# Patient Record
Sex: MALE | Race: WHITE
[De-identification: names, ages, dates, MRNs, and addresses within clinical notes are randomized per-mention and may not be internally consistent; named-entity substitution may affect disease eponyms.]

---

## 2018-03-13 ENCOUNTER — HOSPITAL ENCOUNTER (EMERGENCY)
Dept: HOSPITAL 7 - FB.ED | Age: 33
Discharge: HOME | End: 2018-03-13
Payer: SELF-PAY

## 2018-03-13 DIAGNOSIS — Z77.22: ICD-10-CM

## 2018-03-13 DIAGNOSIS — R10.9: ICD-10-CM

## 2018-03-13 DIAGNOSIS — F17.210: ICD-10-CM

## 2018-03-13 DIAGNOSIS — M79.661: Primary | ICD-10-CM

## 2018-03-13 PROCEDURE — 96372 THER/PROPH/DIAG INJ SC/IM: CPT

## 2018-03-13 PROCEDURE — 99283 EMERGENCY DEPT VISIT LOW MDM: CPT

## 2018-03-13 PROCEDURE — 80305 DRUG TEST PRSMV DIR OPT OBS: CPT

## 2018-03-13 PROCEDURE — 84550 ASSAY OF BLOOD/URIC ACID: CPT

## 2018-03-13 PROCEDURE — 85025 COMPLETE CBC W/AUTO DIFF WBC: CPT

## 2018-03-13 PROCEDURE — 80048 BASIC METABOLIC PNL TOTAL CA: CPT

## 2018-03-13 PROCEDURE — 81001 URINALYSIS AUTO W/SCOPE: CPT

## 2018-03-13 PROCEDURE — 36415 COLL VENOUS BLD VENIPUNCTURE: CPT

## 2018-03-13 NOTE — EDM.PDOC
ED HPI GENERAL MEDICAL PROBLEM





- General


Chief Complaint: Lower Extremity Injury/Pain


Stated Complaint: RT ANKLE PAIN


Time Seen by Provider: 03/13/18 19:40


Source of Information: Reports: Patient


History Limitations: Reports: No Limitations





- History of Present Illness


INITIAL COMMENTS - FREE TEXT/NARRATIVE: 





Ronnie comes into [facility] ED with a 2 day hx of R lower leg pain and discoloration 

of unknown significance. There was reported redness and some swelling of the R 

lower leg that was painful, without specific joint swelling. The pain now seems 

to have migrated up the lateral aspect of the RLE to involve the R flank area. 

Pain is aggravated with movement of the R side, such as raising his RUE 

overhead. There is no jerome chest pain or SOB. There are no GI sxs of fullness, 

nausea, loss of appetite or change in bowel habit. There is no injury hx. He 

has tried Ibuprofen for pain relief.  He has been helping a friend move some 

furniture recently. He is unemployed. 





- Related Data


 Allergies











Allergy/AdvReac Type Severity Reaction Status Date / Time


 


No Known Allergies Allergy   Verified 10/06/16 17:12











Home Meds: 


 Home Meds





Amoxicillin [Take Home: Amoxicillin 500 MG, 2 Cap Pack] 500 mg PO TID 10/06/16 [

History]











Past Medical History





- Past Health History


Medical/Surgical History: Denies Medical/Surgical History


Respiratory History: Reports: Asthma





- Past Surgical History


Respiratory Surgical History: Reports: None





Social & Family History





- Family History


Family Medical History: Noncontributory





- Tobacco Use


Smoking Status *Q: Current Every Day Smoker


Years of Tobacco use: 12


Packs/Tins Daily: 0.5


Used Tobacco, but Quit: No


Second Hand Smoke Exposure: Yes





- Caffeine Use


Caffeine Use: Reports: None





- Alcohol Use


Days Per Week of Alcohol Use: 0


Number of Drinks Per Day: 2


Total Drinks Per Week: 0





- Recreational Drug Use


Recreational Drug Use: No


Drug Use in Last 12 Months: No


Recreational Drug Type: Reports: Marijuana/Hashish





- Living Situation & Occupation


Living situation: Reports: Other


Occupation: Employed





ED ROS GENERAL





- Review of Systems


Review Of Systems: See Below


Constitutional: Reports: No Symptoms


HEENT: Reports: No Symptoms


Respiratory: Reports: No Symptoms


Cardiovascular: Reports: No Symptoms


Endocrine: Reports: No Symptoms


GI/Abdominal: Reports: Abdominal Pain (R flank)


: Reports: Flank Pain (Right)


Musculoskeletal: Reports: Leg Pain (R leg)


Skin: Reports: Erythema (R leg)


Neurological: Reports: No Symptoms


Psychiatric: Reports: No Symptoms


Hematologic/Lymphatic: Reports: No Symptoms


Immunologic: Reports: No Symptoms





ED EXAM, GENERAL





- Physical Exam


Exam: See Below


Exam Limited By: No Limitations


General Appearance: Alert, WD/WN, Anxious, Mild Distress


Head: Normocephalic


Neck: Normal Inspection, Supple, Non-Tender, Full Range of Motion


Respiratory/Chest: Lungs Clear, Normal Breath Sounds, Chest Non-Tender


Cardiovascular: Normal Peripheral Pulses, Regular Rate, Rhythm, No Murmur


GI/Abdominal: Normal Bowel Sounds, Soft, No Organomegaly, No Distention, No Mass

, Guarding (R flank)


 (Male) Exam: No Hernia


Back Exam: Normal Inspection, Full Range of Motion


Extremities: Normal Range of Motion, Leg Pain (R lower leg, with mild blotchy 

erythema and chronic pigmentary changes; no joint effusion of the R ankle or 

knee)


Neurological: Alert, Oriented, CN II-XII Intact, Normal Cognition, No Motor/

Sensory Deficits


Psychiatric: Normal Affect, Normal Mood


Skin Exam: Warm, Dry, Intact, Erythema (R lower leg)


Lymphatic: No Adenopathy





Course





- Vital Signs


Text/Narrative:: 





Medical work up was nondiagnostic for R sided pain affecting the R flank, R 

lower leg. The Toradol 60 mg IM relieved pain on the R side within 30 minutes.





- Orders/Labs/Meds


Labs: 


 Laboratory Tests











  03/13/18 03/13/18 03/13/18 Range/Units





  19:45 19:45 19:45 


 


WBC   10.4   (4.5-12.0)  X10-3/uL


 


RBC   4.74   (4.30-5.75)  x10(6)uL


 


Hgb   14.0   (11.5-15.5)  g/dL


 


Hct   41.9   (30.0-51.3)  %


 


MCV   88.5   (80-96)  fL


 


MCH   29.6   (27.7-33.6)  pg


 


MCHC   33.5   (32.2-35.4)  g/dL


 


RDW   12.3   (11.5-15.5)  %


 


Plt Count   371 H   (125-369)  X10(3)uL


 


MPV   7.0 L   (7.4-10.4)  fL


 


Neut % (Auto)   68.2   (46-82)  %


 


Lymph % (Auto)   19.3   (13-37)  %


 


Mono % (Auto)   9.5   (4-12)  %


 


Eos % (Auto)   2   (1.0-5.0)  %


 


Baso % (Auto)   1   (0-2)  %


 


Neut # (Auto)   7.0   (1.6-8.3)  #


 


Lymph # (Auto)   2.0   (0.6-5.0)  #


 


Mono # (Auto)   1.0   (0.0-1.3)  #


 


Eos # (Auto)   0.3   (0.0-0.8)  #


 


Baso # (Auto)   0.1   (0.0-0.2)  #


 


Sodium    139  (135-145)  mmol/L


 


Potassium    4.2  (3.5-5.3)  mmol/L


 


Chloride    103  (100-110)  mmol/L


 


Carbon Dioxide    28  (21-32)  mmol/L


 


BUN    9  (7-18)  mg/dL


 


Creatinine    0.9  (0.70-1.30)  mg/dL


 


Est Cr Clr Drug Dosing    TNP  


 


Estimated GFR (MDRD)    > 60  (>60)  


 


BUN/Creatinine Ratio    10.0  (9-20)  


 


Glucose    72 L  ()  mg/dL


 


Uric Acid  4.8    (2.6-6.0)  mg/dL


 


Calcium    8.9  (8.6-10.2)  mg/dL


 


Urine Color     (YELLOW)  


 


Urine Appearance     (CLEAR)  


 


Urine pH     (5.0-6.5)  


 


Ur Specific Gravity     (1.010-1.025)  


 


Urine Protein     (NEGATIVE)  mg/dL


 


Urine Glucose (UA)     (NEGATIVE)  mg/dL


 


Urine Ketones     (NEGATIVE)  mg/dL


 


Urine Occult Blood     (NEGATIVE)  


 


Urine Nitrite     (NEGATIVE)  


 


Urine Bilirubin     (NEGATIVE)  


 


Urine Urobilinogen     (NEGATIVE)  mg/dL


 


Ur Leukocyte Esterase     (NEGATIVE)  


 


Urine RBC     (0)  


 


Urine WBC     (0)  


 


Ur Squamous Epith Cells     (NS,R,O)  


 


Urine Bacteria     (NS)  


 


Urine Mucus     (NS)  


 


Urine Opiates Screen     (NEGATIVE)  


 


Ur Oxycodone Screen     (NEGATIVE)  


 


Ur Propoxyphene Screen     (NEGATIVE)  


 


Ur Barbituates Screen     (NEGATIVE)  


 


Ur Tricyclics Screen     (NEGATIVE)  


 


Ur Phencyclidine Scrn     (NEGATIVE)  


 


Ur Amphetamine Screen     (NEGATIVE)  


 


Urine MDMA Screen     (NEGATIVE)  


 


U Benzodiazepines Scrn     (NEGATIVE)  


 


U Cocaine Metab Screen     (NEGATIVE)  


 


U Marijuana (THC) Screen     (NEGATIVE)  














  03/13/18 03/13/18 Range/Units





  20:20 20:20 


 


WBC    (4.5-12.0)  X10-3/uL


 


RBC    (4.30-5.75)  x10(6)uL


 


Hgb    (11.5-15.5)  g/dL


 


Hct    (30.0-51.3)  %


 


MCV    (80-96)  fL


 


MCH    (27.7-33.6)  pg


 


MCHC    (32.2-35.4)  g/dL


 


RDW    (11.5-15.5)  %


 


Plt Count    (125-369)  X10(3)uL


 


MPV    (7.4-10.4)  fL


 


Neut % (Auto)    (46-82)  %


 


Lymph % (Auto)    (13-37)  %


 


Mono % (Auto)    (4-12)  %


 


Eos % (Auto)    (1.0-5.0)  %


 


Baso % (Auto)    (0-2)  %


 


Neut # (Auto)    (1.6-8.3)  #


 


Lymph # (Auto)    (0.6-5.0)  #


 


Mono # (Auto)    (0.0-1.3)  #


 


Eos # (Auto)    (0.0-0.8)  #


 


Baso # (Auto)    (0.0-0.2)  #


 


Sodium    (135-145)  mmol/L


 


Potassium    (3.5-5.3)  mmol/L


 


Chloride    (100-110)  mmol/L


 


Carbon Dioxide    (21-32)  mmol/L


 


BUN    (7-18)  mg/dL


 


Creatinine    (0.70-1.30)  mg/dL


 


Est Cr Clr Drug Dosing    


 


Estimated GFR (MDRD)    (>60)  


 


BUN/Creatinine Ratio    (9-20)  


 


Glucose    ()  mg/dL


 


Uric Acid    (2.6-6.0)  mg/dL


 


Calcium    (8.6-10.2)  mg/dL


 


Urine Color  Yellow   (YELLOW)  


 


Urine Appearance  Clear   (CLEAR)  


 


Urine pH  5.0   (5.0-6.5)  


 


Ur Specific Gravity  1.020   (1.010-1.025)  


 


Urine Protein  Negative   (NEGATIVE)  mg/dL


 


Urine Glucose (UA)  Normal   (NEGATIVE)  mg/dL


 


Urine Ketones  Negative   (NEGATIVE)  mg/dL


 


Urine Occult Blood  Negative   (NEGATIVE)  


 


Urine Nitrite  Negative   (NEGATIVE)  


 


Urine Bilirubin  Negative   (NEGATIVE)  


 


Urine Urobilinogen  Normal   (NEGATIVE)  mg/dL


 


Ur Leukocyte Esterase  Negative   (NEGATIVE)  


 


Urine RBC  Not seen   (0)  


 


Urine WBC  0-5   (0)  


 


Ur Squamous Epith Cells  Few H   (NS,R,O)  


 


Urine Bacteria  Few H   (NS)  


 


Urine Mucus  Few H   (NS)  


 


Urine Opiates Screen   Negative  (NEGATIVE)  


 


Ur Oxycodone Screen   Negative  (NEGATIVE)  


 


Ur Propoxyphene Screen   Negative  (NEGATIVE)  


 


Ur Barbituates Screen   Negative  (NEGATIVE)  


 


Ur Tricyclics Screen   Negative  (NEGATIVE)  


 


Ur Phencyclidine Scrn   Negative  (NEGATIVE)  


 


Ur Amphetamine Screen   Negative  (NEGATIVE)  


 


Urine MDMA Screen   Negative  (NEGATIVE)  


 


U Benzodiazepines Scrn   Negative  (NEGATIVE)  


 


U Cocaine Metab Screen   Negative  (NEGATIVE)  


 


U Marijuana (THC) Screen   Positive H  (NEGATIVE)  











Meds: 


Medications














Discontinued Medications














Generic Name Dose Route Start Last Admin





  Trade Name Freq  PRN Reason Stop Dose Admin


 


Ketorolac Tromethamine  60 mg  03/13/18 19:38  03/13/18 20:23





  Toradol  IM  03/13/18 19:39  60 mg





  ONETIME ONE   Administration














Departure





- Departure


Time of Disposition: 20:45


Disposition: Home, Self-Care 01


Condition: Fair


Clinical Impression: 


 Pain in right leg, Right flank pain








- Discharge Information


Referrals: 


PCP,None [Primary Care Provider] - 


Forms:  ED Department Discharge





- Problem List & Annotations


(1) Pain in right leg


SNOMED Code(s): 235717950


   Code(s): M79.604 - PAIN IN RIGHT LEG   Status: Acute   Current Visit: Yes   

Annotation/Comment:: I dispensed Toradol 10 mg tabs as directed, observation, 

and follow up if sxs persist.    





(2) Right flank pain


SNOMED Code(s): 935864804


   Code(s): R10.9 - UNSPECIFIED ABDOMINAL PAIN   Status: Acute   Current Visit: 

Yes   Annotation/Comment:: I dispensed Toradol 10 mg tabs as directed, 

observation, and follow up if sxs persist or escalate.   





- Problem List Review


Problem List Initiated/Reviewed/Updated: Yes





- Assessment/Plan


Plan: 





Follow up with PCP if sxs persist or escalate.

## 2018-03-14 VITALS — SYSTOLIC BLOOD PRESSURE: 115 MMHG | DIASTOLIC BLOOD PRESSURE: 67 MMHG
